# Patient Record
Sex: FEMALE | URBAN - METROPOLITAN AREA
[De-identification: names, ages, dates, MRNs, and addresses within clinical notes are randomized per-mention and may not be internally consistent; named-entity substitution may affect disease eponyms.]

---
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
## 2024-10-11 ENCOUNTER — APPOINTMENT (OUTPATIENT)
Dept: URBAN - METROPOLITAN AREA CLINIC 254 | Age: 19
Setting detail: DERMATOLOGY
End: 2024-10-11

## 2024-10-11 VITALS — HEIGHT: 63 IN | WEIGHT: 120 LBS

## 2024-10-11 DIAGNOSIS — L90.5 SCAR CONDITIONS AND FIBROSIS OF SKIN: ICD-10-CM

## 2024-10-11 PROCEDURE — OTHER PHOTO-DOCUMENTATION: OTHER

## 2024-10-11 PROCEDURE — OTHER COUNSELING: OTHER

## 2024-10-11 PROCEDURE — 99202 OFFICE O/P NEW SF 15 MIN: CPT

## 2024-10-11 PROCEDURE — OTHER PATIENT SPECIFIC COUNSELING: OTHER

## 2024-10-11 PROCEDURE — OTHER MIPS QUALITY: OTHER

## 2024-10-11 ASSESSMENT — LOCATION SIMPLE DESCRIPTION DERM: LOCATION SIMPLE: RIGHT CHEEK

## 2024-10-11 ASSESSMENT — LOCATION ZONE DERM: LOCATION ZONE: FACE

## 2024-10-11 ASSESSMENT — LOCATION DETAILED DESCRIPTION DERM: LOCATION DETAILED: RIGHT CENTRAL MALAR CHEEK
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
## 2024-10-11 NOTE — PROCEDURE: PATIENT SPECIFIC COUNSELING
-Discussed scar won’t completely go away, but pigmentation should improve with time.\\n-Recommended using Aquaphor or Vaseline while healing\\n-Can trial silicone gel/sheets. Discussed Silagen as well. \\n-Discussed once scar is no longer red or inflamed, can use topical retinoid to affected areas.\\n-Discussed if not improving in couple of months, to RTC. \\n-Patient agreeable with plan.
Detail Level: Zone